# Patient Record
(demographics unavailable — no encounter records)

---

## 2024-10-08 NOTE — HISTORY OF PRESENT ILLNESS
[FreeTextEntry1] : Patient with PD s/p B/L STN  DBS placement in Feb 2023  He is taking sinemet 1/2 tab 5-6 times per day.  He experiences wearing off pain in the right shoulder.  Gait has been stable w/o falls. Denies any FOG  Has occasional LID  He increased DBS 2-3 weeks ago with some reported benefit for wearing offs.    non motor Sleep : good ; rare RBD sx : talks in sleep occasional yelling mood and memory: okay Speech : softer Hallucinations: denies constipation: negative Sense of smell: good   PD meds Sinemet 25/100 1/2 tab every 2-3hrs( 5-6 doses/d) gocovri 274mg hs inbrijia prn BID  prior  ogentys - not covered Sinemet ER  mirapex: stopped due to side effects of sleepiness azilect

## 2024-10-08 NOTE — PROCEDURE
[FreeTextEntry1] :     MDT  Right STN parameters: Group C- 9A(3.6) C(2.9): 11/40/125 (5-11) Impedances: ok Battery: 40% 6months  Notes: FINAL:    Left STN current parameters: 2BC (2.4/1.2): 7.4/50/125 Impedances: ok  Final Settings no changes Programming time: 10 mins

## 2024-10-08 NOTE — PHYSICAL EXAM
[FreeTextEntry1] : 2+ masking. Speech is 1+. There is 1+ L>R bradykinesia with 1+ rigidity..  1+ Mellissa. Tone is normal Gait is normal. No tremor or LID .

## 2024-10-08 NOTE — DISCUSSION/SUMMARY
[FreeTextEntry1] : PS s/p STN DBS with good motoric response. LEDD is low. Suspect that he misses doses during the day leading to OFF periods and notable spikes in LFP on timeline  Patient was counseled on the following recommendations: DBS not adjusted today. F/u Dr. May for planning of IPG replacment. Needs RC unit Leave current Med regimen the same. encouraged him to take LD consistently increase exercising  f/u 4months

## 2024-11-07 NOTE — PHYSICAL EXAM
[General Appearance - Alert] : alert [General Appearance - In No Acute Distress] : in no acute distress [Oriented To Time, Place, And Person] : oriented to person, place, and time [Affect] : the affect was normal [Mood] : the mood was normal [Person] : oriented to person [Place] : oriented to place [Time] : oriented to time [Cranial Nerves Oculomotor (III)] : extraocular motion intact [Cranial Nerves Trigeminal (V)] : facial sensation intact symmetrically [Cranial Nerves Facial (VII)] : face symmetrical [Cranial Nerves Accessory (XI - Cranial And Spinal)] : head turning and shoulder shrug symmetric [Cranial Nerves Hypoglossal (XII)] : there was no tongue deviation with protrusion [Motor Strength] : muscle strength was normal in all four extremities [Sensation Tactile Decrease] : light touch was intact [Balance] : balance was intact [Sclera] : the sclera and conjunctiva were normal [Extraocular Movements] : extraocular movements were intact [Outer Ear] : the ears and nose were normal in appearance [Neck Appearance] : the appearance of the neck was normal [] : no respiratory distress [Respiration, Rhythm And Depth] : normal respiratory rhythm and effort [Heart Rate And Rhythm] : heart rate was normal and rhythm regular [Abnormal Walk] : normal gait [Skin Color & Pigmentation] : normal skin color and pigmentation

## 2024-11-07 NOTE — PROCEDURE
[FreeTextEntry1] : DBS settings checked in office today, no changes made ([employer]tronic).  Left STN-DBS: 2BC-, C+ amp: 7.7 mA pw: 50 us freq: 125 hz  Right STN-DBS: 9AC-, C+ amp: 11.4 mA pw: 40 us freq:  125 hz  battery 36% (predicted 5 months) impedances ok

## 2024-11-07 NOTE — HISTORY OF PRESENT ILLNESS
[FreeTextEntry1] : GUILLERMO WASHINGTON is a 48 year old right handed male with PMH of severe Parkinson's disease, and bilateral carpal tunnel syndrome s/p bilateral endoscopic carpal tunnel release and left and middle finger trigger digit release by Dr. Lala at [facility] 11/1/22. He presents to the office for DBS consultation. He was diagnosed with PD in 2016 by [facility] neurologist. He is now under the care of neurologist Dr. Aguillon. He was experiencing slow movements on the left but has now progressed to the right. Experiencing severe ON/OFF motor fluctuations. When he is OFF, he is so rigid he can't move and his left 3rd digit experiences pain/rigidity. He takes Sinemet  1.5 tablets every 2-3 hours, and Sinemet ER  1 tablet at bedtime. He takes Inbrijia as needed up to 2x daily for improvement in OFF symptoms. He feels vast improvement in his symptoms with Sinemet. With current dosage of medications he is having levodopa induced dyskinesias. Experiencing severe stiffness prior to when next dose of medication is due. He reports a stooped posture when he is due for his medication but otherwise he ambulates well and denies recent falls. His off symptoms also include a painful right hand dystonia. He has recently been having difficulty swallowing foods/liquids when OFF, hypophonic at times. No hallucinations, no sudden FOG. No changes in bowel or bladder habits. No constipation. Has mood fluctuations due to discomfort. Denies suicidal or homicidal ideations. He continues to work as a . Difficulty with fine motor skills like buttoning clothes and zippers but able to perform ADLs independently with medication. Still able to be active but with medication he is able to do less. He had ON/OFF testing with Dr. Aguillon 11/30/22 following a 12 hour withdrawal of his Parkinson's medications, and neuropsych testing with Dr. Lieberman on 11/4/22.  He underwent stage 1 b/l STN DBS with b/l frontal crani for left and right DBS electrode placement on 2/1/23. He underwent stage 2 DBS with lead extension and pulse generator placement 2/13/23 (Medtronic).   He presents today to discuss IPG replacement. His tremor, stiffness, and slowness are well controlled until he wears off. He wears off 2.5-3 hours after each dose of medicaiton. When he wears off he feels very slow and has difficulty walking. He has occasional dyskinesia but is taking Gocovri which is helping.

## 2024-11-07 NOTE — HISTORY OF PRESENT ILLNESS
[FreeTextEntry1] : GUILLERMO WAHSINGTON is a 48 year old right handed male with PMH of severe Parkinson's disease, and bilateral carpal tunnel syndrome s/p bilateral endoscopic carpal tunnel release and left and middle finger trigger digit release by Dr. Lala at [facility] 11/1/22. He presents to the office for DBS consultation. He was diagnosed with PD in 2016 by [facility] neurologist. He is now under the care of neurologist Dr. Aguillon. He was experiencing slow movements on the left but has now progressed to the right. Experiencing severe ON/OFF motor fluctuations. When he is OFF, he is so rigid he can't move and his left 3rd digit experiences pain/rigidity. He takes Sinemet  1.5 tablets every 2-3 hours, and Sinemet ER  1 tablet at bedtime. He takes Inbrijia as needed up to 2x daily for improvement in OFF symptoms. He feels vast improvement in his symptoms with Sinemet. With current dosage of medications he is having levodopa induced dyskinesias. Experiencing severe stiffness prior to when next dose of medication is due. He reports a stooped posture when he is due for his medication but otherwise he ambulates well and denies recent falls. His off symptoms also include a painful right hand dystonia. He has recently been having difficulty swallowing foods/liquids when OFF, hypophonic at times. No hallucinations, no sudden FOG. No changes in bowel or bladder habits. No constipation. Has mood fluctuations due to discomfort. Denies suicidal or homicidal ideations. He continues to work as a . Difficulty with fine motor skills like buttoning clothes and zippers but able to perform ADLs independently with medication. Still able to be active but with medication he is able to do less. He had ON/OFF testing with Dr. Aguillon 11/30/22 following a 12 hour withdrawal of his Parkinson's medications, and neuropsych testing with Dr. Lieberman on 11/4/22.  He underwent stage 1 b/l STN DBS with b/l frontal crani for left and right DBS electrode placement on 2/1/23. He underwent stage 2 DBS with lead extension and pulse generator placement 2/13/23 (Medtronic).   He presents today to discuss IPG replacement. His tremor, stiffness, and slowness are well controlled until he wears off. He wears off 2.5-3 hours after each dose of medicaiton. When he wears off he feels very slow and has difficulty walking. He has occasional dyskinesia but is taking Gocovri which is helping.

## 2024-11-07 NOTE — ASSESSMENT
[FreeTextEntry1] : IMPRESSION: 48M with PMH of severe Parkinson's disease with severe ON/OFF motor fluctuations. He is s/p stage 1 b/l STN-DBS 2/1/23. He is s/p stage 2 DBS with lead extension and IPG placement 2/13/23. He presents for 1 year post DBS follow up.  The patient will have removal and replacement of right Medtronic IPG at the [facility] [place]. He will have sedation. This would be an outpatient procedure and the patient would go home that same day along with pain meds and scheduled antibiotics for ~1 week. He would need to coordinate a ride home from surgery, can't drive. Return to clinic 2 weeks later for assessment of surgical incisions and staple removal. He would like a rechargeable battery as this did not last even 2 years. Discussed the need for recharging. He would like to have replaced in January.   I discussed the risk and benefits of the procedure including bleeding, infection. The battery will be turned back on immediately with current DBS settings. He will continue to follow up with Dr. Aguillon for programming/medication management.   The patient shows good understanding of the procedure, the risk and benefits, and wishes to proceed with the procedure as soon as possible.    PLAN: -Continue to f/u with neurology for medication/stimulation optimization.  -Schedule surgery  I, Dr. José Antonio May, personally performed the evaluation and management (E/M) services for this established patient who presents today with (a) new problem(s)/exacerbation of (an) existing condition(s).  That E/M includes conducting the clinically appropriate interval history &/or exam, assessing all new/exacerbated conditions, and establishing a new plan of care.  Today, my KENNY, Gisselle James, was here to observe my evaluation and management service for this new problem/exacerbated condition and follow the plan of care established by me going forward.

## 2024-11-07 NOTE — ASSESSMENT
[FreeTextEntry1] : IMPRESSION: 48M with PMH of severe Parkinson's disease with severe ON/OFF motor fluctuations. He is s/p stage 1 b/l STN-DBS 2/1/23. He is s/p stage 2 DBS with lead extension and IPG placement 2/13/23. He presents for 1 year post DBS follow up.  The patient will have removal and replacement of right Medtronic IPG at the [facility] [place]. He will have sedation. This would be an outpatient procedure and the patient would go home that same day along with pain meds and scheduled antibiotics for ~1 week. He would need to coordinate a ride home from surgery, can't drive. Return to clinic 2 weeks later for assessment of surgical incisions and staple removal. He would like a rechargeable battery as this did not last even 2 years. Discussed the need for recharging. He would like to have replaced in January.   I discussed the risk and benefits of the procedure including bleeding, infection. The battery will be turned back on immediately with current DBS settings. He will continue to follow up with Dr. Aguillon for programming/medication management.   The patient shows good understanding of the procedure, the risk and benefits, and wishes to proceed with the procedure as soon as possible.    PLAN: -Continue to f/u with neurology for medication/stimulation optimization.  -Schedule surgery  I, Dr. José Antonio May, personally performed the evaluation and management (E/M) services for this established patient who presents today with (a) new problem(s)/exacerbation of (an) existing condition(s).  That E/M includes conducting the clinically appropriate interval history &/or exam, assessing all new/exacerbated conditions, and establishing a new plan of care.  Today, my KENNY, Gisselle James, was here to observe my evaluation and management service for this new problem/exacerbated condition and follow the plan of care established by me going forward.

## 2025-01-24 NOTE — ASSESSMENT
[FreeTextEntry1] : 48M with PMH of severe Parkinson's disease with severe ON/OFF motor fluctuations. He is s/p stage 1 b/l STN-DBS 2/1/23. He is s/p stage 2 DBS with lead extension and IPG placement 2/13/23. He is s/p IPG exchange 1/13/25.   Right sub clavicular incision clean, dry, healing well and without drainage. The surrounding skin erythematous, but otherwise nontender, not warm and not indurated.  Staples intact, Left in place today. Advised to continue washing his body with moisturizing gentle soap to prevent infection and to avoid picking at scabs and the incision. Assured it is normal to feel itchiness from scab formation. Will order clindamycin 300 mg q6h x 7 days.   PLAN: -Complete entire course of antibiotics -Do not submerge incision in water -Return to office 2/4 for staple removal and reassessment of incision

## 2025-01-24 NOTE — REASON FOR VISIT
[de-identified] :  Removal and replacement of dual channel pulse generator (Medtronic)   [de-identified] : 1/13/25 [Family Member] : family member

## 2025-01-24 NOTE — PHYSICAL EXAM
[General Appearance - Alert] : alert [General Appearance - In No Acute Distress] : in no acute distress [Clean] : clean [Dry] : dry [Intact] : intact [Staple Intact] : closed with intact staples [No Drainage] : without drainage [Normal Skin] : normal [Erythema] : erythematous [Oriented To Time, Place, And Person] : oriented to person, place, and time [Affect] : the affect was normal [Mood] : the mood was normal [Person] : oriented to person [Place] : oriented to place [Time] : oriented to time [Motor Strength] : muscle strength was normal in all four extremities [Sclera] : the sclera and conjunctiva were normal [Outer Ear] : the ears and nose were normal in appearance [Neck Appearance] : the appearance of the neck was normal [] : no respiratory distress [Respiration, Rhythm And Depth] : normal respiratory rhythm and effort [Tender] : not tender [Warm] : not warm [Indurated] : not indurated [FreeTextEntry1] : right sub clavicular

## 2025-01-24 NOTE — REASON FOR VISIT
[de-identified] :  Removal and replacement of dual channel pulse generator (Medtronic)   [de-identified] : 1/13/25 [Family Member] : family member

## 2025-01-24 NOTE — HISTORY OF PRESENT ILLNESS
[FreeTextEntry1] : GUILLERMO WASHINGTON is a 48 year old right handed male with PMH of severe Parkinson's disease, and bilateral carpal tunnel syndrome s/p bilateral endoscopic carpal tunnel release and left and middle finger trigger digit release by Dr. aLla at [facility] 11/1/22. He presents to the office for DBS consultation. He was diagnosed with PD in 2016 by [facility] neurologist. He is now under the care of neurologist Dr. Aguillon. He was experiencing slow movements on the left but has now progressed to the right. Experiencing severe ON/OFF motor fluctuations. When he is OFF, he is so rigid he can't move and his left 3rd digit experiences pain/rigidity. He takes Sinemet  1.5 tablets every 2-3 hours, and Sinemet ER  1 tablet at bedtime. He takes Inbrijia as needed up to 2x daily for improvement in OFF symptoms. He feels vast improvement in his symptoms with Sinemet. With current dosage of medications he is having levodopa induced dyskinesias. Experiencing severe stiffness prior to when next dose of medication is due. He reports a stooped posture when he is due for his medication but otherwise he ambulates well and denies recent falls. His off symptoms also include a painful right hand dystonia. He has recently been having difficulty swallowing foods/liquids when OFF, hypophonic at times. No hallucinations, no sudden FOG. No changes in bowel or bladder habits. No constipation. Has mood fluctuations due to discomfort. Denies suicidal or homicidal ideations. He continues to work as a . Difficulty with fine motor skills like buttoning clothes and zippers but able to perform ADLs independently with medication. Still able to be active but with medication he is able to do less. He had ON/OFF testing with Dr. Aguillon 11/30/22 following a 12 hour withdrawal of his Parkinson's medications, and neuropsych testing with Dr. Lieberman on 11/4/22. He underwent stage 1 b/l STN DBS with b/l frontal crani for left and right DBS electrode placement on 2/1/23. He underwent stage 2 DBS with lead extension and pulse generator placement 2/13/23 ([employer]). He underwent removal and replacement of dual channel pulse generator 1/13/25.    Today he presents with concern of some redness around right IPG site. Denies fevers, bleeding, or drainage from incision. No other complaints. He is traveling next week so wants to make sure there are no complications with incision.

## 2025-01-24 NOTE — HISTORY OF PRESENT ILLNESS
[FreeTextEntry1] : GUILLERMO WASHINGTON is a 48 year old right handed male with PMH of severe Parkinson's disease, and bilateral carpal tunnel syndrome s/p bilateral endoscopic carpal tunnel release and left and middle finger trigger digit release by Dr. Lala at [facility] 11/1/22. He presents to the office for DBS consultation. He was diagnosed with PD in 2016 by [facility] neurologist. He is now under the care of neurologist Dr. Aguillon. He was experiencing slow movements on the left but has now progressed to the right. Experiencing severe ON/OFF motor fluctuations. When he is OFF, he is so rigid he can't move and his left 3rd digit experiences pain/rigidity. He takes Sinemet  1.5 tablets every 2-3 hours, and Sinemet ER  1 tablet at bedtime. He takes Inbrijia as needed up to 2x daily for improvement in OFF symptoms. He feels vast improvement in his symptoms with Sinemet. With current dosage of medications he is having levodopa induced dyskinesias. Experiencing severe stiffness prior to when next dose of medication is due. He reports a stooped posture when he is due for his medication but otherwise he ambulates well and denies recent falls. His off symptoms also include a painful right hand dystonia. He has recently been having difficulty swallowing foods/liquids when OFF, hypophonic at times. No hallucinations, no sudden FOG. No changes in bowel or bladder habits. No constipation. Has mood fluctuations due to discomfort. Denies suicidal or homicidal ideations. He continues to work as a . Difficulty with fine motor skills like buttoning clothes and zippers but able to perform ADLs independently with medication. Still able to be active but with medication he is able to do less. He had ON/OFF testing with Dr. Aguillon 11/30/22 following a 12 hour withdrawal of his Parkinson's medications, and neuropsych testing with Dr. Lieberman on 11/4/22. He underwent stage 1 b/l STN DBS with b/l frontal crani for left and right DBS electrode placement on 2/1/23. He underwent stage 2 DBS with lead extension and pulse generator placement 2/13/23 ([employer]). He underwent removal and replacement of dual channel pulse generator 1/13/25.    Today he presents with concern of some redness around right IPG site. Denies fevers, bleeding, or drainage from incision. No other complaints. He is traveling next week so wants to make sure there are no complications with incision.

## 2025-02-04 NOTE — REASON FOR VISIT
[de-identified] :  Removal and replacement of dual channel pulse generator (Medtronic)   [de-identified] : 1/13/25

## 2025-02-04 NOTE — HISTORY OF PRESENT ILLNESS
[FreeTextEntry1] : GUILLERMO WASHINGTON is a 48 year old right handed male with PMH of severe Parkinson's disease, and bilateral carpal tunnel syndrome s/p bilateral endoscopic carpal tunnel release and left and middle finger trigger digit release by Dr. Lala at [facility] 11/1/22. He presents to the office for DBS consultation. He was diagnosed with PD in 2016 by [facility] neurologist. He is now under the care of neurologist Dr. Aguillon. He was experiencing slow movements on the left but has now progressed to the right. Experiencing severe ON/OFF motor fluctuations. When he is OFF, he is so rigid he can't move and his left 3rd digit experiences pain/rigidity. He takes Sinemet  1.5 tablets every 2-3 hours, and Sinemet ER  1 tablet at bedtime. He takes Inbrijia as needed up to 2x daily for improvement in OFF symptoms. He feels vast improvement in his symptoms with Sinemet. With current dosage of medications he is having levodopa induced dyskinesias. Experiencing severe stiffness prior to when next dose of medication is due. He reports a stooped posture when he is due for his medication but otherwise he ambulates well and denies recent falls. His off symptoms also include a painful right hand dystonia. He has recently been having difficulty swallowing foods/liquids when OFF, hypophonic at times. No hallucinations, no sudden FOG. No changes in bowel or bladder habits. No constipation. Has mood fluctuations due to discomfort. Denies suicidal or homicidal ideations. He continues to work as a . Difficulty with fine motor skills like buttoning clothes and zippers but able to perform ADLs independently with medication. Still able to be active but with medication he is able to do less. He had ON/OFF testing with Dr. Aguillon 11/30/22 following a 12 hour withdrawal of his Parkinson's medications, and neuropsych testing with Dr. Lieberman on 11/4/22. He underwent stage 1 b/l STN DBS with b/l frontal crani for left and right DBS electrode placement on 2/1/23. He underwent stage 2 DBS with lead extension and pulse generator placement 2/13/23 ([employer]). He underwent removal and replacement of dual channel pulse generator 1/13/25.    Today he presents for follow up assessment of surgical incision and staple removal. He completed additional course of antibiotics. No fevers, chills, bleeding, or drainage from incisions.

## 2025-02-04 NOTE — REASON FOR VISIT
[de-identified] :  Removal and replacement of dual channel pulse generator (Medtronic)   [de-identified] : 1/13/25

## 2025-02-04 NOTE — HISTORY OF PRESENT ILLNESS
[FreeTextEntry1] : GUILLERMO WASHINGTON is a 48 year old right handed male with PMH of severe Parkinson's disease, and bilateral carpal tunnel syndrome s/p bilateral endoscopic carpal tunnel release and left and middle finger trigger digit release by Dr. Lala at [facility] 11/1/22. He presents to the office for DBS consultation. He was diagnosed with PD in 2016 by [facility] neurologist. He is now under the care of neurologist Dr. Aguillon. He was experiencing slow movements on the left but has now progressed to the right. Experiencing severe ON/OFF motor fluctuations. When he is OFF, he is so rigid he can't move and his left 3rd digit experiences pain/rigidity. He takes Sinemet  1.5 tablets every 2-3 hours, and Sinemet ER  1 tablet at bedtime. He takes Inbrijia as needed up to 2x daily for improvement in OFF symptoms. He feels vast improvement in his symptoms with Sinemet. With current dosage of medications he is having levodopa induced dyskinesias. Experiencing severe stiffness prior to when next dose of medication is due. He reports a stooped posture when he is due for his medication but otherwise he ambulates well and denies recent falls. His off symptoms also include a painful right hand dystonia. He has recently been having difficulty swallowing foods/liquids when OFF, hypophonic at times. No hallucinations, no sudden FOG. No changes in bowel or bladder habits. No constipation. Has mood fluctuations due to discomfort. Denies suicidal or homicidal ideations. He continues to work as a . Difficulty with fine motor skills like buttoning clothes and zippers but able to perform ADLs independently with medication. Still able to be active but with medication he is able to do less. He had ON/OFF testing with Dr. Aguillon 11/30/22 following a 12 hour withdrawal of his Parkinson's medications, and neuropsych testing with Dr. Lieberman on 11/4/22. He underwent stage 1 b/l STN DBS with b/l frontal crani for left and right DBS electrode placement on 2/1/23. He underwent stage 2 DBS with lead extension and pulse generator placement 2/13/23 ([employer]). He underwent removal and replacement of dual channel pulse generator 1/13/25.    Today he presents for follow up assessment of surgical incision and staple removal. He completed additional course of antibiotics. No fevers, chills, bleeding, or drainage from incisions.

## 2025-02-04 NOTE — ASSESSMENT
[FreeTextEntry1] : 48M with PMH of severe Parkinson's disease with severe ON/OFF motor fluctuations. He is s/p stage 1 b/l STN-DBS 2/1/23. He is s/p stage 2 DBS with lead extension and IPG placement 2/13/23. He is s/p IPG exchange 1/13/25.   Right sub clavicular incision clean, dry, healing well and without drainage. The surrounding skin was normal, not erythematous, nontender, not warm and not indurated. No signs and symptoms of infection. Staples intact, removed staples in office today with no issues. Advised to continue washing body with moisturizing gentle soap to prevent infection and to avoid picking at scabs and the incision. Assured it is normal to feel itchiness from scab formation.      PLAN: -Return to office as needed, and for future battery changes (Has rechargeable battery) -Continue to f/u with neurology -Do not submerge incisions or apply vit E oil or scar cream for 5 days

## 2025-02-04 NOTE — PHYSICAL EXAM
[General Appearance - Alert] : alert [General Appearance - In No Acute Distress] : in no acute distress [Healing Well] : healing well [Staple Intact] : closed with intact staples [No Drainage] : without drainage [Normal Skin] : normal [Erythema] : not erythematous [Tender] : not tender [Warm] : not warm [Indurated] : not indurated [FreeTextEntry1] : right subclavicular [Oriented To Time, Place, And Person] : oriented to person, place, and time [Affect] : the affect was normal [Mood] : the mood was normal [Person] : oriented to person [Place] : oriented to place [Time] : oriented to time [Cranial Nerves Oculomotor (III)] : extraocular motion intact [Cranial Nerves Trigeminal (V)] : facial sensation intact symmetrically [Cranial Nerves Facial (VII)] : face symmetrical [Cranial Nerves Accessory (XI - Cranial And Spinal)] : head turning and shoulder shrug symmetric [Cranial Nerves Hypoglossal (XII)] : there was no tongue deviation with protrusion [Motor Strength] : muscle strength was normal in all four extremities [Sensation Tactile Decrease] : light touch was intact [Balance] : balance was intact [Sclera] : the sclera and conjunctiva were normal [Outer Ear] : the ears and nose were normal in appearance [Neck Appearance] : the appearance of the neck was normal [] : no respiratory distress [Respiration, Rhythm And Depth] : normal respiratory rhythm and effort [Abnormal Walk] : normal gait [Skin Color & Pigmentation] : normal skin color and pigmentation

## 2025-07-15 NOTE — PHYSICAL EXAM
[General Appearance - Alert] : alert [Oriented To Time, Place, And Person] : oriented to person, place, and time [FreeTextEntry1] : 2+ masking. Speech is 1+. There is 1+ L>R bradykinesia with 2+ rigidity.  Arises with arms crossed slowly. Gait is normal. No tremor or LID  60 min from last dose .

## 2025-07-15 NOTE — DISCUSSION/SUMMARY
[FreeTextEntry1] : Patient with Parkinson's disease status post STN DBS with significant reduction in dopaminergic medications. Switched to aDBS with [employer].   Patient was counseled on the following recommendations: - aDBS today  -Continue 1/2 tab sinemet q3h -Continue gocovri 274mg  To check in with [facility] over the phone in 1-2 weeks   f/u 2 months

## 2025-07-15 NOTE — PROCEDURE
[FreeTextEntry1] : MDT  Right STN parameters: Group C- 9A(3.8) C(3.3): 11.7/40/125 (0-11.8) Impedances: ok Battery: 80%  ADBS: LFP single threshold: 300 --> 156 (11.2-11.9) PAUSE AMP 11.7  Left STN current parameters:  2BC (2.5/1.1):: 7.5/50/125 (0-7.8) Impedances: ok  ADBS: LFP single threshold: 170->90 (7.4-7.9) PAUSE AMP 7.6   Programming time: 15 mins

## 2025-07-15 NOTE — HISTORY OF PRESENT ILLNESS
[FreeTextEntry1] : Patient with PD s/p B/L STN  DBS placement in Feb 2023  Patient tried crexont 1 capsule TID. WHile taking he felt more off and went back to regular levodopa. He reports freezing and festinating gait intermittently throughout the day. Each dose of levodopa takes 15 min to take effect, his hands feel stronger and gait is better. About 2.5-3 hours later he wears off and has FOG. Tremors are well controlled. Main symptoms are slowness and gait impairment. No falls. Independent with ADLs  last dose 1 hour ago Reports peak dose LID  which subsides after 20-30 min  non motor Sleep : good  mood and memory: okay but struggles to focus.  Speech : softer Hallucinations: denies constipation: negative  PD meds Sinemet 25/100 1/2 tab every 3hrs( 5-6 doses/d) gocovri 274mg hs   prior  [facility] - not covered Sinemet ER  mirapex: stopped due to side effects of sleepiness azilect